# Patient Record
Sex: MALE | Race: WHITE | URBAN - METROPOLITAN AREA
[De-identification: names, ages, dates, MRNs, and addresses within clinical notes are randomized per-mention and may not be internally consistent; named-entity substitution may affect disease eponyms.]

---

## 2017-01-19 RX ORDER — LEVOTHYROXINE SODIUM 200 UG/1
TABLET ORAL
Qty: 90 TAB | Refills: 0 | Status: SHIPPED | OUTPATIENT
Start: 2017-01-19 | End: 2017-04-17 | Stop reason: SDUPTHER

## 2017-03-21 ENCOUNTER — CLINICAL SUPPORT (OUTPATIENT)
Dept: FAMILY MEDICINE CLINIC | Age: 52
End: 2017-03-21

## 2017-03-21 DIAGNOSIS — Z23 ENCOUNTER FOR IMMUNIZATION: Primary | ICD-10-CM

## 2018-01-23 ENCOUNTER — TELEPHONE (OUTPATIENT)
Dept: FAMILY MEDICINE CLINIC | Age: 53
End: 2018-01-23

## 2018-01-23 NOTE — TELEPHONE ENCOUNTER
Patient called to office to inquire on message he states he left with a gentlemen on last Thursday. Patient  States he's currently out of town and will come home next week, but will be leaving again on 2/18/18 to [facility] for international travel. Patient states that Dr. Somers gave him a lot of vaccines last year and most of them should cover him. However patient is concerned about yellow fever and doesn't believe he was given vaccine to cover that. Patient also notes that he's getting over the cold and flu and asking if this would affect anything. Patient is aware it's time to come in for office visit and labs and will schedule once he speak to nurse. Patient is asking for a response if not today, then no later then tomorrow.       Call [phone]    thanks

## 2018-01-24 NOTE — TELEPHONE ENCOUNTER
Advised patient to call the [facility] Department for the yellow fever vaccine. Aware to schedule an appointment to be seen.

## 2018-01-25 NOTE — TELEPHONE ENCOUNTER
Patient called to office to inquire on message he states he left with a gentlemen on last Thursday. Patient  States he's currently out of town and will come home next week, but will be leaving again on 2/18/18 to [facility] for international travel. Patient states that Dr. Guicho Adamson gave him a lot of vaccines last year and most of them should cover him. However patient is concerned about yellow fever and doesn't believe he was given vaccine to cover that. Patient also notes that he's getting over the cold and flu and asking if this would affect anything.     Patient is aware it's time to come in for office visit and labs and will schedule once he speak to nurse.  Patient is asking for a response if not today, then no later then tomorrow.        Call [phone]     Thanks    To be seen to review travel

## 2018-02-02 ENCOUNTER — OFFICE VISIT (OUTPATIENT)
Dept: FAMILY MEDICINE CLINIC | Age: 53
End: 2018-02-02

## 2018-02-02 VITALS
HEART RATE: 64 BPM | RESPIRATION RATE: 16 BRPM | OXYGEN SATURATION: 97 % | DIASTOLIC BLOOD PRESSURE: 63 MMHG | SYSTOLIC BLOOD PRESSURE: 116 MMHG | WEIGHT: 299 LBS | HEIGHT: 78 IN | TEMPERATURE: 97.7 F | BODY MASS INDEX: 34.59 KG/M2

## 2018-02-02 DIAGNOSIS — Z23 NEED FOR PROPHYLACTIC VACCINATION AGAINST YELLOW FEVER: ICD-10-CM

## 2018-02-02 DIAGNOSIS — Z71.84 TRAVEL ADVICE ENCOUNTER: ICD-10-CM

## 2018-02-02 DIAGNOSIS — Z29.8 NEED FOR MALARIA PROPHYLAXIS: ICD-10-CM

## 2018-02-02 DIAGNOSIS — E03.9 ACQUIRED HYPOTHYROIDISM: Primary | ICD-10-CM

## 2018-02-02 RX ORDER — MEFLOQUINE HYDROCHLORIDE 250 MG/1
250 TABLET ORAL
Qty: 6 TAB | Refills: 0 | Status: SHIPPED | OUTPATIENT
Start: 2018-02-02 | End: 2018-03-10

## 2018-02-02 RX ORDER — MEFLOQUINE HYDROCHLORIDE 250 MG/1
250 TABLET ORAL
Qty: 6 TAB | Refills: 0 | Status: SHIPPED | OUTPATIENT
Start: 2018-02-02 | End: 2018-02-02 | Stop reason: CLARIF

## 2018-02-02 NOTE — MR AVS SNAPSHOT
[address] 
[phone] Patient: Celina Chen MRN: [id number] :[id number] Visit Information Date & Time Provider Department Dept. Phone Encounter #  
 2018  9:00 AM José Luis Galicia MD [address] [facility] [phone] [id number] Follow-up Instructions Return if symptoms worsen or fail to improve. Upcoming Health Maintenance Date Due FOBT Q 1 YEAR AGE 50-75 2017 Influenza Age 5 to Adult 2017 DTaP/Tdap/Td series (2 - Td) 2021 Allergies as of 2018  Review Complete On: 2018 By: José Luis Galicia MD  
  
 Severity Noted Reaction Type Reactions Keflex [Cephalexin]  2014    Rash  
 Sulfa (Sulfonamide Antibiotics)  2014    Rash Current Immunizations  Reviewed on 3/21/2017 Name Date Hep A Vaccine (Adult) 3/21/2017, 2016 Hep B Vaccine (Adult) 3/21/2017, 11/3/2016, 2016 Influenza Vaccine (Quad) PF 2016 TDAP Vaccine 2011 Not reviewed this visit You Were Diagnosed With   
  
 Codes Comments Acquired hypothyroidism    -  Primary ICD-10-CM: E03.9 ICD-9-CM: 244.9 Travel advice encounter     ICD-10-CM: Z71.89 ICD-9-CM: V65.49 Need for malaria prophylaxis     ICD-10-CM: Z29.8 ICD-9-CM: V07.8 Need for prophylactic vaccination against yellow fever     ICD-10-CM: G42 ICD-9-CM: V04.4 Vitals BP Pulse Temp Resp Height(growth percentile) Weight(growth percentile) 116/63 (BP 1 Location: Left arm, BP Patient Position: Sitting) 64 97.7 °F (36.5 °C) (Oral) 16 6' 8\" (2.032 m) 299 lb (135.6 kg) SpO2 BMI Smoking Status 97% 32.85 kg/m2 Never Smoker Vitals History BMI and BSA Data Body Mass Index Body Surface Area  
 32.85 kg/m 2 2.77 m 2 Preferred Pharmacy Pharmacy Name Phone  [address] [facility] [phone] Your Updated Medication List  
  
   
This list is accurate as of: 18  9:36 AM.  Always use your most recent med list.  
  
  
  
  
 levothyroxine 200 mcg tablet Commonly known as:  SYNTHROID  
TAKE 1 TABLET DAILY BEFORE BREAKFAST (DUE FOR LABS AGAIN)  
  
 mefloquine 250 mg tablet Commonly known as:  LARIAM  
Take 250 mg by mouth every seven (7) days for 6 doses. Take with food and at least 8 ounces of water  
  
 rifAXIMin 200 mg tablet Commonly known as:  Elsy Moseley Take 1 Tab by mouth daily. yellow fever live (PF) 1,000 unit/0.5 mL Susr vaccine Commonly known as:  STAMARIL  
0.5 mL by SubCUTAneous route once for 1 dose. Prescriptions Printed Refills  
 yellow fever live, PF, (STAMARIL) 1,000 unit/0.5 mL susr vaccine 0 Si.5 mL by SubCUTAneous route once for 1 dose. Class: Print Route: SubCUTAneous  
 rifAXIMin (XIFAXAN) 200 mg tablet 0 Sig: Take 1 Tab by mouth daily. Class: Print Route: Oral  
  
Prescriptions Sent to Pharmacy Refills  
 mefloquine (LARIAM) 250 mg tablet 0 Sig: Take 250 mg by mouth every seven (7) days for 6 doses. Take with food and at least 8 ounces of water Class: Normal  
 Pharmacy: [address], [address] [place] [address] #: [phone] Route: Oral  
  
We Performed the Following TSH 3RD GENERATION [89558 CPT(R)] Follow-up Instructions Return if symptoms worsen or fail to improve. Patient Instructions Start taking mefloquine (malaria prevention) 1 week prior to your trip, then continue weekly through 4 doses after leaving [facility]. Take rifaximin every day during your trip for prevention of traveler's diarrhea. Introducing [facility]! [employer] introduces [employer] patient portal. Now you can access parts of your medical record, email your doctor's office, and request medication refills online. 1. In your internet browser, go to [email or web address] 2. Click on the First Time User? Click Here link in the Sign In box. You will see the New Member Sign Up page. 3. Enter your [employer] Access Code exactly as it appears below. You will not need to use this code after youve completed the sign-up process. If you do not sign up before the expiration date, you must request a new code. · [employer] Access Code: [email or web address] Expires: 5/3/2018  9:36 AM 
 
4. Enter the last four digits of your Social Security Number (xxxx) and Date of Birth (mm/dd/yyyy) as indicated and click Submit. You will be taken to the next sign-up page. 5. Create a Begunt ID. This will be your [employer]era login ID and cannot be changed, so think of one that is secure and easy to remember. 6. Create a [employer]era password. You can change your password at any time. 7. Enter your Password Reset Question and Answer. This can be used at a later time if you forget your password. 8. Enter your e-mail address. You will receive e-mail notification when new information is available in [address] 9. Click Sign Up. You can now view and download portions of your medical record. 10. Click the Download Summary menu link to download a portable copy of your medical information. If you have questions, please visit the Frequently Asked Questions section of the [employer] website. Remember, [employer] is NOT to be used for urgent needs. For medical emergencies, dial 911. Now available from your iPhone and Android! Please provide this summary of care documentation to your next provider. Your primary care clinician is listed as Ocie Cushing. If you have any questions after today's visit, please call [phone].

## 2018-02-02 NOTE — PROGRESS NOTES
Chief Complaint   Patient presents with   [facility]     Thyroid check; immunizations for internantional travel

## 2018-02-02 NOTE — PATIENT INSTRUCTIONS
Start taking mefloquine (malaria prevention) 1 week prior to your trip, then continue weekly through 4 doses after leaving [facility]. Take rifaximin every day during your trip for prevention of traveler's diarrhea.

## 2018-02-02 NOTE — PROGRESS NOTES
Rubin Mora is an 52 y.o. male who presents for   Chief Complaint   Patient presents with   [facility]     Thyroid check; immunizations for internantional travel     Currently on synthroid 200 mcg. Takes every morning 30 minutes before meal. Denies fatigue, cold or hot intolerance, no change in appetite, no skin changes. [facility]. Received 2 dose series of Hep A, 3 dose series of Hep B, typhoid, and malaria prophylaxis when he went in 9/2016. Allergies - reviewed: Allergies   Allergen Reactions    Keflex [Cephalexin] Rash    Sulfa (Sulfonamide Antibiotics) Rash         Medications - reviewed:   Current Outpatient Prescriptions   Medication Sig    rifAXIMin (XIFAXAN) 200 mg tablet Take 1 Tab by mouth daily.  mefloquine (LARIAM) 250 mg tablet Take 250 mg by mouth every seven (7) days for 6 doses. Take with food and at least 8 ounces of water    levothyroxine (SYNTHROID) 200 mcg tablet TAKE 1 TABLET DAILY BEFORE BREAKFAST (DUE FOR LABS AGAIN)     No current facility-administered medications for this visit. Past Medical History - reviewed:  Past Medical History:   Diagnosis Date    Seasonal allergic rhinitis     Thyroid disease          Past Surgical History - reviewed:   Past Surgical History:   Procedure Laterality Date    ENDOSCOPY, COLON, DIAGNOSTIC      2004, Dr. Sury Plascencia    [facility]      deviated septum- late 1970's    HX OTHER SURGICAL      tonsilectomy 1970    HX OTHER SURGICAL      cyst removal from back BRYCE Ramirez 1 2009    Removal of thyroid gland         Social History - reviewed:  Social History     Social History    Marital status:      Spouse name: N/A    Number of children: N/A    Years of education: N/A     Occupational History    Not on file.      Social History Main Topics    Smoking status: Never Smoker    Smokeless tobacco: Never Used    Alcohol use 1.5 oz/week     1 Glasses of wine, 1 Cans of beer, 1 Shots of liquor per week      Comment: 4 drinks a week    Drug use: No    Sexual activity: Yes     Partners: Female     Other Topics Concern    Not on file     Social History Narrative         Family History - reviewed:  Family History   Problem Relation Age of Onset    Hypertension Mother     Cancer Father     Elevated Lipids Father     Alcohol abuse Father     Hypertension Father     Hypertension Brother     Elevated Lipids Brother     Alcohol abuse Brother     Cancer Paternal Grandfather      bone cancer         Immunizations - reviewed:   Immunization History   Administered Date(s) Administered    Hep A Vaccine (Adult) 09/20/2016, 03/21/2017    Hep B Vaccine (Adult) 09/20/2016, 11/03/2016, 03/21/2017    Influenza Vaccine (Quad) PF 09/20/2016    TDAP Vaccine 09/26/2011         ROS  ENT: Denies: dysphagia  CARDIOVASCULAR: Denies: palpitations  ENDOCRINE: Denies: skin changes, temperature intolerance, unexpected weight changes      Physical Exam  Visit Vitals    /63 (BP 1 Location: Left arm, BP Patient Position: Sitting)    Pulse 64    Temp 97.7 °F (36.5 °C) (Oral)    Resp 16    Ht 6' 8\" (2.032 m)    Wt 299 lb (135.6 kg)    SpO2 97%    BMI 32.85 kg/m2       General appearance - alert, well appearing, and in no distress  Eyes - EOMI  Mouth - mucous membranes moist, pharynx normal without lesions  Neck - supple, no significant adenopathy, thyroid exam: thyroid is normal in size without nodules or tenderness  Chest - clear to auscultation, no wheezes, rales or rhonchi, symmetric air entry  Heart - normal rate, regular rhythm, normal S1, S2, no murmurs, rubs, clicks or gallops  Neurological - alert, oriented, normal speech, no focal findings or movement disorder noted  Skin - normal coloration and turgor, no rashes, no suspicious skin lesions noted      Assessment/Plan    ICD-10-CM ICD-9-CM    1. Acquired hypothyroidism E03.9 244.9 TSH 3RD GENERATION   2.  Travel advice encounter Z71.89 V65.49 rifAXIMin (XIFAXAN) 200 mg tablet   3. Need for malaria prophylaxis Z29.8 V07.8 mefloquine (LARIAM) 250 mg tablet      DISCONTINUED: mefloquine (LARIAM) 250 mg tablet   4. Need for prophylactic vaccination against yellow fever Z23 V04.4 yellow fever live, PF, (STAMARIL) 1,000 unit/0.5 mL susr vaccine       Will check TSH today, has been on stable dose of thyroid for years. Normal thyroid exam.    Will give script for rifaximin and discussed using bismuth OTC for diarrhea prophylaxis, mefloquine for malaria prevention, and also gave a script for yellow fever vaccine. Has already received typhoid in 2016, which is good for 5 years. Up to date on hep A and B vaccines. Follow-up Disposition:  Return if symptoms worsen or fail to improve. I have discussed the diagnosis with the patient and the intended plan as seen in the above orders. The patient has received an after-visit summary and questions were answered concerning future plans. I have discussed medication side effects and warnings with the patient as well. Korina Parry MD  Family Medicine Resident    Patient discussed with Dr. Ty Collet, attending physician.

## 2018-02-03 LAB — TSH SERPL DL<=0.005 MIU/L-ACNC: 1.09 UIU/ML (ref 0.45–4.5)

## 2018-02-05 ENCOUNTER — TELEPHONE (OUTPATIENT)
Dept: FAMILY MEDICINE CLINIC | Age: 53
End: 2018-02-05

## 2018-02-05 NOTE — TELEPHONE ENCOUNTER
Jon Martínez with [facility] calling, and states he's faxed a few request to our office to get Auth for malaria and also traveler's diarrhea treatment. Patient is expected to come to store this evening.  Asking for response ASAP    Thank you       [phone]

## 2018-02-05 NOTE — TELEPHONE ENCOUNTER
Get Hope with [facility] calling, and states he's faxed a few request to our office to get Auth for malaria and also traveler's diarrhea treatment.     Patient is expected to come to store this evening.  Asking for response ASAP     Thank you         [phone]      Patient should be seen to discuss his travel needs

## 2018-03-05 ENCOUNTER — TELEPHONE (OUTPATIENT)
Dept: FAMILY MEDICINE CLINIC | Age: 53
End: 2018-03-05

## 2018-03-07 NOTE — TELEPHONE ENCOUNTER
Last labs 02/02/2018 TSH. Recent Results Listed Below  Can I advise patient of normal TSH Results. Or have you contacted patient?   Results for orders placed or performed in visit on 02/02/18   TSH 3RD GENERATION   Result Value Ref Range    TSH 1.090 0.450 - 4.500 uIU/mL

## 2018-03-12 ENCOUNTER — TELEPHONE (OUTPATIENT)
Dept: FAMILY MEDICINE CLINIC | Age: 53
End: 2018-03-12

## 2019-01-28 ENCOUNTER — OFFICE VISIT (OUTPATIENT)
Dept: FAMILY MEDICINE CLINIC | Age: 54
End: 2019-01-28

## 2019-01-28 VITALS
DIASTOLIC BLOOD PRESSURE: 78 MMHG | HEART RATE: 64 BPM | BODY MASS INDEX: 31.59 KG/M2 | WEIGHT: 273 LBS | TEMPERATURE: 97.9 F | HEIGHT: 78 IN | SYSTOLIC BLOOD PRESSURE: 139 MMHG | RESPIRATION RATE: 20 BRPM | OXYGEN SATURATION: 100 %

## 2019-01-28 DIAGNOSIS — E03.9 ACQUIRED HYPOTHYROIDISM: ICD-10-CM

## 2019-01-28 DIAGNOSIS — R42 DIZZINESS: Primary | ICD-10-CM

## 2019-01-28 NOTE — PROGRESS NOTES
[facility]  
Outpatient Resident Progress Note Encounter Date: 1/28/2019 Chief Complaint Patient presents with  Dizziness On Thursday while skiing, no falls  Medication Refill Thyroid History of Present Illness Patient is a 62 y.o. male, who presents to clinic for Dizziness (On Thursday while skiing, no falls) and Medication Refill (Thyroid) Patient was skiing on 1/24/2019 when he developed an episode of dizziness that prompted him to stop skiing. Dizziness improved and eventually resolved after rest and hydration. No further episodes afterwards. He reports that he spent the whole day skiing, and was a while since he practiced the sport, and that he did not take much water throughout the day. Denies any falls or trauma, fever, fatigue, headaches, changes in vision, cough, sore throat, CP, SOB, sputum production, abdominal pain or tenderness, nausea, vomiting, dysuria, hematuria, diarrhea, melena, hematochezia, leg swelling, or any other complains at this moment. Patient and wife are concerns that this presentation might be an indication of a brain tumor, as wife's mother was recently diagnosed. Review of Systems A complete ROS was reviewed and only pertinent items documented on HPI. Allergies - reviewed: Allergies Allergen Reactions  Keflex [Cephalexin] Rash  Sulfa (Sulfonamide Antibiotics) Rash Medications - reviewed:  
Current Outpatient Medications Medication Sig  levothyroxine (SYNTHROID) 200 mcg tablet TAKE 1 TABLET DAILY BEFORE BREAKFAST (DUE FOR LABS AGAIN)  rifAXIMin (XIFAXAN) 200 mg tablet Take 1 Tab by mouth daily. No current facility-administered medications for this visit. Past Medical History - reviewed: 
Past Medical History:  
Diagnosis Date  Seasonal allergic rhinitis  Thyroid disease Family Medical History - reviewed: 
Family History Problem Relation Age of Onset  Hypertension Mother  Cancer Father  Elevated Lipids Father  Alcohol abuse Father  Hypertension Father  Hypertension Brother  Elevated Lipids Brother  Alcohol abuse Brother  Cancer Paternal Grandfather   
     bone cancer Objective Visit Vitals /78 (BP 1 Location: Left arm, BP Patient Position: Sitting) Pulse 64 Temp 97.9 °F (36.6 °C) (Oral) Resp 20 Ht 6' 8\" (2.032 m) Wt 273 lb (123.8 kg) SpO2 100% BMI 29.99 kg/m² Body mass index is 29.99 kg/m². Nursing note and vitals reviewed. Physical Exam 
Constitutional: Well-developed, well-nourished, and in no distress. HENT:  
Head: Normocephalic and atraumatic. Right Ear: External ear normal. Left Ear: External ear normal.  
Nose: Nose normal.  
Mouth/Throat: Oropharynx is clear and moist. No oropharyngeal erythema, no exudate. Eyes: Conjunctivae and EOM are normal. Pupils are equal, round, and reactive to light. Right eye exhibits no discharge. Left eye exhibits no discharge. No scleral icterus. Neck: Normal range of motion. Neck supple. No JVD present. No tracheal deviation present. No thyromegaly present. Lymphadenopathy: No cervical adenopathy. Cardiovascular: Normal rate, regular rhythm, normal heart sounds and intact distal pulses. Exam reveals no gallop and no friction rub. No murmur heard. Pulmonary/Chest: Effort normal and breath sounds normal. No respiratory distress. No wheezes, no rales, no tenderness. Abdominal: Soft. Bowel sounds are normal. No distension and no mass. There is no tenderness. There is no rebound and no guarding. Musculoskeletal: Normal range of motion. Exhibits no edema, tenderness or deformity. Neurological: Alert and oriented to person, place, and time. Normal reflexes. No cranial nerve deficit. Gait normal. Coordination normal.  
Skin: Skin is warm and dry. No rash noted. Not diaphoretic. No erythema. No pallor. Psychiatric: Mood, memory, affect and judgment normal.  
 
Assessment / Plan  
Mr. Destinee Shaw is a 62 y.o. male with the following medical condition(s): 1. Dizziness Patient explained that given the Hx and presentation of the dizziness, it might be related to an episode of dehydration. Other potential causes were discussed with patient, recommended RTC if further episodes. No imaging recommended at this time. Advised to stay well hydrated, specially while practicing sports, including skiing.  
- CBC WITH AUTOMATED DIFF 
- METABOLIC PANEL, COMPREHENSIVE 2. Acquired hypothyroidism Will recheck TSH levels at this time. Refill for Levothyroxine will be provided. - TSH 3RD GENERATION Patient was advised to return to clinic in case of worsening of symptoms or fail to improve. Life threatening signs and symptoms were discussed and patient advised to go to the nearest ED for prompt evaluation and care in case of onset of any of these. Follow-up Disposition: 
Return in about 2 weeks (around 2/11/2019), or if symptoms worsen or fail to improve, for F/U dizziness. · I have discussed the diagnosis with the patient and the intended plan as seen in the above orders. The patient has received an after-visit summary and questions were answered concerning future plans. I have discussed medication side effects and warnings with the patient as well. Patient/Plan discussed with Dr. Sasha Rust (Attending Physician) Armando Parish MD 
PGY-3 Family Medicine Resident Encounter Date: 1/28/2019

## 2019-01-28 NOTE — PROGRESS NOTES
Chief Complaint Patient presents with  Dizziness On Thursday while skiing, no falls  Medication Refill Thyroid 1. Have you been to the ER, urgent care clinic since your last visit? Hospitalized since your last visit? No 
 
2. Have you seen or consulted any other health care providers outside of the [address] [place] [address] since your last visit? Include any pap smears or colon screening. No  
 
Reviewed record in preparation for visit and have obtained necessary documentation.

## 2019-01-28 NOTE — PROGRESS NOTES
62year old male states that he felt dizzy while skiing Patient states that he did not fall F/U prn Labs done today Hx hypothyroidism I reviewed with the resident the medical history and the resident's findings on the physical examination. I discussed with the resident the patient's diagnosis and concur with the plan.

## 2019-01-29 LAB
ALBUMIN SERPL-MCNC: 4.3 G/DL (ref 3.5–5.5)
ALBUMIN/GLOB SERPL: 1.9 {RATIO} (ref 1.2–2.2)
ALP SERPL-CCNC: 43 IU/L (ref 39–117)
ALT SERPL-CCNC: 14 IU/L (ref 0–44)
AST SERPL-CCNC: 11 IU/L (ref 0–40)
BASOPHILS # BLD AUTO: 0 X10E3/UL (ref 0–0.2)
BASOPHILS NFR BLD AUTO: 0 %
BILIRUB SERPL-MCNC: 0.6 MG/DL (ref 0–1.2)
BUN SERPL-MCNC: 12 MG/DL (ref 6–24)
BUN/CREAT SERPL: 18 (ref 9–20)
CALCIUM SERPL-MCNC: 8.8 MG/DL (ref 8.7–10.2)
CHLORIDE SERPL-SCNC: 104 MMOL/L (ref 96–106)
CO2 SERPL-SCNC: 26 MMOL/L (ref 20–29)
CREAT SERPL-MCNC: 0.66 MG/DL (ref 0.76–1.27)
EOSINOPHIL # BLD AUTO: 0.2 X10E3/UL (ref 0–0.4)
EOSINOPHIL NFR BLD AUTO: 2 %
ERYTHROCYTE [DISTWIDTH] IN BLOOD BY AUTOMATED COUNT: 13.8 % (ref 12.3–15.4)
GLOBULIN SER CALC-MCNC: 2.3 G/DL (ref 1.5–4.5)
GLUCOSE SERPL-MCNC: 93 MG/DL (ref 65–99)
HCT VFR BLD AUTO: 40.8 % (ref 37.5–51)
HGB BLD-MCNC: 14 G/DL (ref 13–17.7)
IMM GRANULOCYTES # BLD AUTO: 0 X10E3/UL (ref 0–0.1)
IMM GRANULOCYTES NFR BLD AUTO: 0 %
LYMPHOCYTES # BLD AUTO: 1.3 X10E3/UL (ref 0.7–3.1)
LYMPHOCYTES NFR BLD AUTO: 18 %
MCH RBC QN AUTO: 30.6 PG (ref 26.6–33)
MCHC RBC AUTO-ENTMCNC: 34.3 G/DL (ref 31.5–35.7)
MCV RBC AUTO: 89 FL (ref 79–97)
MONOCYTES # BLD AUTO: 0.4 X10E3/UL (ref 0.1–0.9)
MONOCYTES NFR BLD AUTO: 6 %
NEUTROPHILS # BLD AUTO: 5.4 X10E3/UL (ref 1.4–7)
NEUTROPHILS NFR BLD AUTO: 74 %
PLATELET # BLD AUTO: 179 X10E3/UL (ref 150–379)
POTASSIUM SERPL-SCNC: 4.2 MMOL/L (ref 3.5–5.2)
PROT SERPL-MCNC: 6.6 G/DL (ref 6–8.5)
RBC # BLD AUTO: 4.57 X10E6/UL (ref 4.14–5.8)
SODIUM SERPL-SCNC: 143 MMOL/L (ref 134–144)
TSH SERPL DL<=0.005 MIU/L-ACNC: 0.17 UIU/ML (ref 0.45–4.5)
WBC # BLD AUTO: 7.4 X10E3/UL (ref 3.4–10.8)

## 2019-01-31 ENCOUNTER — TELEPHONE (OUTPATIENT)
Dept: FAMILY MEDICINE CLINIC | Age: 54
End: 2019-01-31

## 2019-01-31 DIAGNOSIS — E03.9 ACQUIRED HYPOTHYROIDISM: Primary | ICD-10-CM

## 2019-01-31 NOTE — TELEPHONE ENCOUNTER
Called patient to discussed results from lab work on 1/28/2019. TSH were lower than normal, requested add-on free T4. Explained to patient that depending on results of the add-on labs there might be a change on Levothyroxine dose. Patient understands the plan. Will continue to follow up.      Orders Placed This Encounter    T4, FREE     Add-on lab to prior sample from 1/28/19     Leanne Girard MD  PGY-3 Family Medicine Resident

## 2019-02-01 LAB — SPECIMEN STATUS REPORT, ROLRST: NORMAL

## 2019-02-06 ENCOUNTER — TELEPHONE (OUTPATIENT)
Dept: FAMILY MEDICINE CLINIC | Age: 54
End: 2019-02-06

## 2019-02-06 LAB
SPECIMEN STATUS REPORT, ROLRST: NORMAL
T4 FREE SERPL-MCNC: 2 NG/DL (ref 0.82–1.77)

## 2019-02-06 NOTE — TELEPHONE ENCOUNTER
----- Message from Brannon Lopez sent at 2/6/2019 10:03 AM EST -----  Regarding: Dr. Irene Mccormick  Pt is requesting a call back regarding his f/up lab work ordered by Dr. Dedrick Murrieta. Best contact is [phone].

## 2019-02-07 ENCOUNTER — TELEPHONE (OUTPATIENT)
Dept: FAMILY MEDICINE CLINIC | Age: 54
End: 2019-02-07

## 2019-02-07 DIAGNOSIS — E03.9 ACQUIRED HYPOTHYROIDISM: Primary | ICD-10-CM

## 2019-02-07 RX ORDER — LEVOTHYROXINE SODIUM 175 UG/1
175 TABLET ORAL
Qty: 60 TAB | Refills: 0 | Status: SHIPPED | OUTPATIENT
Start: 2019-02-07 | End: 2019-04-08

## 2019-02-07 NOTE — TELEPHONE ENCOUNTER
Called patient to discuss adjustment on Levothiroxine from 200 mcg to 175 mcg as there were low levels of TSH and High free T4. Patient currently on treatment for acquired hypothyroidism. There were no answer, will try again later today (2/7/2019). Will retest TSH in 6 weeks. Orders Placed This Encounter    levothyroxine (SYNTHROID) 175 mcg tablet     Sig: Take 1 Tab by mouth Daily (before breakfast) for 60 days.      Dispense:  60 Tab     Refill:  0     Vidya Iglesias MD  PGY-3 Family Medicine Resident

## 2019-02-07 NOTE — PROGRESS NOTES
Results noted. Decreased dose of Levothyroxine from 200 mcg to 175 mcg. Will recheck TSH levels in 8 weeks. Called patient with no response, sent letter explaining change. Vidya Iglesias MD 
PGY-3 Family Medicine Resident

## 2019-02-11 ENCOUNTER — OFFICE VISIT (OUTPATIENT)
Dept: FAMILY MEDICINE CLINIC | Age: 54
End: 2019-02-11

## 2019-02-11 VITALS
SYSTOLIC BLOOD PRESSURE: 124 MMHG | HEART RATE: 69 BPM | TEMPERATURE: 97 F | WEIGHT: 271.8 LBS | DIASTOLIC BLOOD PRESSURE: 83 MMHG | BODY MASS INDEX: 31.45 KG/M2 | HEIGHT: 78 IN | OXYGEN SATURATION: 99 % | RESPIRATION RATE: 16 BRPM

## 2019-02-11 DIAGNOSIS — R42 DIZZINESS: Primary | ICD-10-CM

## 2019-02-11 DIAGNOSIS — E03.9 ACQUIRED HYPOTHYROIDISM: ICD-10-CM

## 2019-02-11 NOTE — PROGRESS NOTES
[facility]     Outpatient Resident Progress Note    Encounter Date: 2/11/2019    Chief Complaint   Patient presents with    Follow-up     Dizziness; presents with significant improvements    Abnormal Lab Results     Follow up on TSH and T4 levels       History of Present Illness    Patient is a 62 y.o. male, who presents to clinic for Follow-up (Dizziness; presents with significant improvements) and Abnormal Lab Results (Follow up on TSH and T4 levels)  Patient reports that there have been no other episodes of dizziness, since he keeps himself well hydrated as recommended during prior visit. On lab work done during last encounter, there were evidence of low TSH and high free T4. He is currently on Synthroid 200 mcg daily for acquired hypothyroidism. Patient reports that for a few months, he have been modifying his diet and doing exercise toward weight loss. He have lost 28 lbs compared to 2/2/2018. Denies changes in skin or hair, cold or heat intolerance, fever, fatigue, dizziness, lightheadedness, headaches, sore throat, CP, palpitations, SOB, abdominal pain or tenderness, nausea, vomiting, dysuria, hematuria, diarrhea, melena, hematochezia, leg swelling, or any other complains at this moment. Review of Systems    A complete ROS was reviewed and only pertinent items documented on HPI. Allergies - reviewed: Allergies   Allergen Reactions    Keflex [Cephalexin] Rash    Sulfa (Sulfonamide Antibiotics) Rash       Medications - reviewed:   Current Outpatient Medications   Medication Sig    levothyroxine (SYNTHROID) 175 mcg tablet Take 1 Tab by mouth Daily (before breakfast) for 60 days. No current facility-administered medications for this visit.         Past Medical History - reviewed:  Past Medical History:   Diagnosis Date    Seasonal allergic rhinitis     Thyroid disease        Family Medical History - reviewed:  Family History   Problem Relation Age of Onset    Hypertension Mother     Cancer Father     Elevated Lipids Father     Alcohol abuse Father     Hypertension Father     Hypertension Brother     Elevated Lipids Brother     Alcohol abuse Brother     Cancer Paternal Grandfather         bone cancer       Objective  Visit Vitals  /83   Pulse 69   Temp 97 °F (36.1 °C)   Resp 16   Ht 6' 8\" (2.032 m)   Wt 271 lb 12.8 oz (123.3 kg)   SpO2 99%   BMI 29.86 kg/m²     Body mass index is 29.86 kg/m². Nursing note and vitals reviewed. Physical Exam  Constitutional: Well-developed, well-nourished, and in no distress. Cardiovascular: Normal rate, regular rhythm, normal heart sounds and intact distal pulses. Exam reveals no gallop and no friction rub. No murmur heard. Pulmonary/Chest: Effort normal and breath sounds normal. No respiratory distress. No wheezes, no rales, no tenderness. Musculoskeletal: Normal range of motion. Exhibits no edema, tenderness or deformity. Neurological:  Alert and oriented to person, place, and time. Normal reflexes. No cranial nerve deficit. Gait normal. Coordination normal.   Psychiatric: Mood, memory, affect and judgment normal.     Assessment / Plan   Mr. Pablo Diaz is a 62 y.o. male with the following medical condition(s):    1. Dizziness  Resolved. This single episode was likely related to dehydration during high exertion exercise. Recommended to continue proper hydration, specially during exercise. 2. Acquired hypothyroidism  Decreased Synthroid from 200 mcg to 175 mcg daily. Will retest in 8 weeks. - TSH 3RD GENERATION; Future    Patient was advised to return to clinic in case of worsening of symptoms or fail to improve. Life threatening signs and symptoms were discussed and patient advised to go to the nearest ED for prompt evaluation and care in case of onset of any of these.      Follow-up Disposition:  Return in about 8 weeks (around 4/8/2019) for Follow up TSH levels after Levothyroxine dose adjustment. · I have discussed the diagnosis with the patient and the intended plan as seen in the above orders. The patient has received an after-visit summary and questions were answered concerning future plans. I have discussed medication side effects and warnings with the patient as well.       Patient/Plan discussed with Dr. Jenni Koenig (Attending Physician)      Kwame Peterson MD  PGY-3 Family Medicine Resident  Encounter Date: 2/11/2019

## 2019-02-11 NOTE — PROGRESS NOTES
Chief Complaint   Patient presents with    Follow-up     Dizziness; presents with significant improvements

## 2019-02-11 NOTE — PATIENT INSTRUCTIONS
Hypothyroidism: Care Instructions  Your Care Instructions    You have hypothyroidism, which means that your body is not making enough thyroid hormone. This hormone helps your body use energy. If your thyroid level is low, you may feel tired, be constipated, have an increase in your blood pressure, or have dry skin or memory problems. You may also get cold easily, even when it is warm. Women with low thyroid levels may have heavy menstrual periods. A blood test to find your thyroid-stimulating hormone (TSH) level is used to check for hypothyroidism. A high TSH level may mean that you have low thyroid. When your body is not making enough thyroid hormone, TSH levels rise in an effort to make the body produce more. The treatment for hypothyroidism is to take thyroid hormone pills. You should start to feel better in 1 to 2 weeks. But it can take several months to see changes in the TSH level. You will need regular visits with your doctor to make sure you have the right dose of medicine. Most people need treatment for the rest of their lives. You will need to see your doctor regularly to have blood tests and to make sure you are doing well. Follow-up care is a key part of your treatment and safety. Be sure to make and go to all appointments, and call your doctor if you are having problems. It's also a good idea to know your test results and keep a list of the medicines you take. How can you care for yourself at home? · Take your thyroid hormone medicine exactly as prescribed. Call your doctor if you think you are having a problem with your medicine. Most people do not have side effects if they take the right amount of medicine regularly. ? Take the medicine 30 minutes before breakfast, and do not take it with calcium, vitamins, or iron. ? Do not take extra doses of your thyroid medicine. It will not help you get better any faster, and it may cause side effects.   ? If you forget to take a dose, do NOT take a double dose of medicine. Take your usual dose the next day. · Tell your doctor about all prescription, herbal, or over-the-counter products you take. · Take care of yourself. Eat a healthy diet, get enough sleep, and get regular exercise. When should you call for help? Call 911 anytime you think you may need emergency care. For example, call if:    · You passed out (lost consciousness).     · You have severe trouble breathing.     · You have a very slow heartbeat (less than 60 beats a minute).     · You have a low body temperature (95°F or below).    Call your doctor now or seek immediate medical care if:    · You feel tired, sluggish, or weak.     · You have trouble remembering things or concentrating.     · You do not begin to feel better 2 weeks after starting your medicine.    Watch closely for changes in your health, and be sure to contact your doctor if you have any problems. Where can you learn more? Go to http://bruna-jamila.info/. Enter Y984 in the search box to learn more about \"Hypothyroidism: Care Instructions. \"  Current as of: March 14, 2018  Content Version: 11.9  © 7536-5436 [employer]. Care instructions adapted under license by [employer] (which disclaims liability or warranty for this information). If you have questions about a medical condition or this instruction, always ask your healthcare professional. [address] any warranty or liability for your use of this information.